# Patient Record
Sex: MALE | Race: WHITE | NOT HISPANIC OR LATINO
[De-identification: names, ages, dates, MRNs, and addresses within clinical notes are randomized per-mention and may not be internally consistent; named-entity substitution may affect disease eponyms.]

---

## 2019-04-24 PROBLEM — Z00.00 ENCOUNTER FOR PREVENTIVE HEALTH EXAMINATION: Status: ACTIVE | Noted: 2019-04-24

## 2019-04-29 ENCOUNTER — APPOINTMENT (OUTPATIENT)
Dept: CARDIOLOGY | Facility: CLINIC | Age: 69
End: 2019-04-29
Payer: MEDICARE

## 2019-04-29 VITALS
HEIGHT: 70 IN | BODY MASS INDEX: 26.2 KG/M2 | DIASTOLIC BLOOD PRESSURE: 70 MMHG | WEIGHT: 183 LBS | SYSTOLIC BLOOD PRESSURE: 102 MMHG | HEART RATE: 61 BPM

## 2019-04-29 VITALS
SYSTOLIC BLOOD PRESSURE: 102 MMHG | HEART RATE: 81 BPM | WEIGHT: 183 LBS | DIASTOLIC BLOOD PRESSURE: 70 MMHG | BODY MASS INDEX: 26.2 KG/M2 | HEIGHT: 70 IN

## 2019-04-29 DIAGNOSIS — Z86.59 PERSONAL HISTORY OF OTHER MENTAL AND BEHAVIORAL DISORDERS: ICD-10-CM

## 2019-04-29 DIAGNOSIS — Z78.9 OTHER SPECIFIED HEALTH STATUS: ICD-10-CM

## 2019-04-29 DIAGNOSIS — R53.83 OTHER FATIGUE: ICD-10-CM

## 2019-04-29 PROCEDURE — 93015 CV STRESS TEST SUPVJ I&R: CPT

## 2019-04-29 PROCEDURE — 99202 OFFICE O/P NEW SF 15 MIN: CPT | Mod: 25

## 2019-04-29 RX ORDER — ESCITALOPRAM OXALATE 20 MG/1
20 TABLET, FILM COATED ORAL DAILY
Refills: 0 | Status: ACTIVE | COMMUNITY

## 2019-04-29 RX ORDER — BUPROPION HYDROCHLORIDE 300 MG/1
300 TABLET, EXTENDED RELEASE ORAL DAILY
Refills: 0 | Status: ACTIVE | COMMUNITY

## 2019-04-29 NOTE — PHYSICAL EXAM

## 2019-04-29 NOTE — DISCUSSION/SUMMARY
[FreeTextEntry1] : The patient's cardiac examination was normal. As part of today's evaluation I performed a stress test which revealed no evidence of exercise-induced myocardial ischemia or arrhythmias. The exercise capacity is somewhat limited which was expected since he has not been active over the past several months. I see no cardiac cause of the patient's fatigue. I urged him to get back into a program of daily aerobic exercise and build up from there. 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 I did not feel that any further cardiac testing was necessary and I did not prescribe any medications at today's visit.

## 2019-04-29 NOTE — HISTORY OF PRESENT ILLNESS
[FreeTextEntry1] : The patient has enjoyed excellent health throughout his life. His only hospitalization was for an appendectomy. There is no history of hypertension diabetes or hyperlipidemia. He is a never smoker. Alcohol use is very infrequent.

## 2023-06-20 ENCOUNTER — APPOINTMENT (OUTPATIENT)
Dept: PULMONOLOGY | Facility: CLINIC | Age: 73
End: 2023-06-20
Payer: MEDICARE

## 2023-06-20 VITALS
BODY MASS INDEX: 27.92 KG/M2 | HEIGHT: 70 IN | WEIGHT: 195 LBS | DIASTOLIC BLOOD PRESSURE: 60 MMHG | OXYGEN SATURATION: 96 % | TEMPERATURE: 95.7 F | HEART RATE: 65 BPM | SYSTOLIC BLOOD PRESSURE: 112 MMHG

## 2023-06-20 PROCEDURE — 99204 OFFICE O/P NEW MOD 45 MIN: CPT

## 2023-06-20 NOTE — HISTORY OF PRESENT ILLNESS
[TextBox_4] : 73 year old male who was recently diagnosed with BRIAN came in for initial evaluation. Has depression\par 2 weeks ago he started to c/o dyspnea on exertion. he describes dyspnea as not getting enough air in the lungs and this lasted for almost 2 hrs.\par Dyspnea started after he was diagnosed with BRIAN.\par he sleeps well at night. Does not know if he snores.\par His only complaint was fatigue for years.\par BT 2 am\par SLT 30 min\par Has mild nocturia\par WT 11 am\par takes 30 min naps\par Feels tired in the daytime.\par Denies wheezing, chest tightness. \par never diagnosed with lung disease.\par never smoker\par FHX: denies\par \par cardiac stress test was negative.\par

## 2023-06-20 NOTE — PHYSICAL EXAM
[No Acute Distress] : no acute distress [II] : Mallampati Class: II [Normal Appearance] : normal appearance [Normal Rate/Rhythm] : normal rate/rhythm [Normal S1, S2] : normal s1, s2 [No Resp Distress] : no resp distress [Clear to Auscultation Bilaterally] : clear to auscultation bilaterally [No Abnormalities] : no abnormalities [Normal Gait] : normal gait [No Clubbing] : no clubbing [No Cyanosis] : no cyanosis [No Focal Deficits] : no focal deficits [Oriented x3] : oriented x3 [Normal Affect] : normal affect

## 2023-06-27 ENCOUNTER — RESULT REVIEW (OUTPATIENT)
Age: 73
End: 2023-06-27

## 2023-08-11 ENCOUNTER — APPOINTMENT (OUTPATIENT)
Dept: PULMONOLOGY | Facility: CLINIC | Age: 73
End: 2023-08-11
Payer: MEDICARE

## 2023-08-11 VITALS
HEART RATE: 85 BPM | DIASTOLIC BLOOD PRESSURE: 70 MMHG | HEIGHT: 70 IN | OXYGEN SATURATION: 98 % | SYSTOLIC BLOOD PRESSURE: 122 MMHG | TEMPERATURE: 95 F | WEIGHT: 195 LBS | BODY MASS INDEX: 27.92 KG/M2

## 2023-08-11 DIAGNOSIS — R06.09 OTHER FORMS OF DYSPNEA: ICD-10-CM

## 2023-08-11 PROCEDURE — 99214 OFFICE O/P EST MOD 30 MIN: CPT

## 2023-08-11 NOTE — PHYSICAL EXAM
[No Acute Distress] : no acute distress [Normal S1, S2] : normal s1, s2 [No Resp Distress] : no resp distress [Clear to Auscultation Bilaterally] : clear to auscultation bilaterally [No Abnormalities] : no abnormalities [Normal Gait] : normal gait [No Focal Deficits] : no focal deficits [Oriented x3] : oriented x3 [Normal Affect] : normal affect

## 2023-08-11 NOTE — HISTORY OF PRESENT ILLNESS
[TextBox_4] : 73 year old male with BRIAN and dyspnea on exertion came for f/u. He does not have dyspnea anymore since he started breathing exercises. Denies cough, hemoptysis Sleep schedule the same. Did not receive PAP yet. [facility] does not have the initial note before the sleep study from his PMD CXR seen, my read: no infiltrates PFT's with mild decrease DLCO 6 MWT no desaturations

## 2023-11-20 ENCOUNTER — APPOINTMENT (OUTPATIENT)
Dept: PULMONOLOGY | Facility: CLINIC | Age: 73
End: 2023-11-20
Payer: MEDICARE

## 2023-11-20 VITALS
DIASTOLIC BLOOD PRESSURE: 70 MMHG | HEIGHT: 70 IN | SYSTOLIC BLOOD PRESSURE: 103 MMHG | BODY MASS INDEX: 27.92 KG/M2 | TEMPERATURE: 96.8 F | WEIGHT: 195 LBS | HEART RATE: 63 BPM | OXYGEN SATURATION: 98 %

## 2023-11-20 PROCEDURE — 99213 OFFICE O/P EST LOW 20 MIN: CPT

## 2024-05-20 ENCOUNTER — APPOINTMENT (OUTPATIENT)
Dept: PULMONOLOGY | Facility: CLINIC | Age: 74
End: 2024-05-20
Payer: MEDICARE

## 2024-05-20 VITALS
WEIGHT: 195 LBS | SYSTOLIC BLOOD PRESSURE: 109 MMHG | HEIGHT: 70 IN | HEART RATE: 68 BPM | DIASTOLIC BLOOD PRESSURE: 64 MMHG | TEMPERATURE: 97 F | BODY MASS INDEX: 27.92 KG/M2 | OXYGEN SATURATION: 97 %

## 2024-05-20 DIAGNOSIS — G47.33 OBSTRUCTIVE SLEEP APNEA (ADULT) (PEDIATRIC): ICD-10-CM

## 2024-05-20 PROCEDURE — 99213 OFFICE O/P EST LOW 20 MIN: CPT

## 2024-05-20 NOTE — HISTORY OF PRESENT ILLNESS
[FreeTextEntry1] : 74 year old male with moderate BRIAN came for f/u Compliance report reviewed: Usage 47% Average use 2 hrs 10 min P95 7>4 Leak 7 AHI 2.8 He did not use PAP last month due to a severe leg pain that now has resolved and he started to use PAP again He sleeps well with PAP and has a good mask fit Went over compliance report with him

## 2024-06-07 ENCOUNTER — TRANSCRIPTION ENCOUNTER (OUTPATIENT)
Age: 74
End: 2024-06-07

## 2025-07-18 ENCOUNTER — APPOINTMENT (OUTPATIENT)
Dept: GASTROENTEROLOGY | Facility: CLINIC | Age: 75
End: 2025-07-18
Payer: MEDICARE

## 2025-07-18 VITALS
HEART RATE: 56 BPM | OXYGEN SATURATION: 95 % | SYSTOLIC BLOOD PRESSURE: 120 MMHG | BODY MASS INDEX: 28.63 KG/M2 | DIASTOLIC BLOOD PRESSURE: 70 MMHG | WEIGHT: 200 LBS | HEIGHT: 70 IN

## 2025-07-18 PROBLEM — Z12.11 ENCOUNTER FOR SCREENING COLONOSCOPY: Status: ACTIVE | Noted: 2025-07-18 | Resolved: 2025-08-01

## 2025-07-18 PROBLEM — Z86.0100 HISTORY OF COLON POLYPS: Status: ACTIVE | Noted: 2025-07-18

## 2025-07-18 PROCEDURE — 99204 OFFICE O/P NEW MOD 45 MIN: CPT

## 2025-07-18 PROCEDURE — G2211 COMPLEX E/M VISIT ADD ON: CPT

## 2025-07-18 RX ORDER — MAGNESIUM OXIDE 420 MG/1
TABLET ORAL
Refills: 0 | Status: ACTIVE | COMMUNITY

## 2025-07-18 RX ORDER — AZELASTINE HYDROCHLORIDE AND FLUTICASONE PROPIONATE 137; 50 UG/1; UG/1
137-50 SPRAY, METERED NASAL
Refills: 0 | Status: ACTIVE | COMMUNITY

## 2025-07-18 RX ORDER — ATORVASTATIN CALCIUM 10 MG/1
10 TABLET, FILM COATED ORAL
Refills: 0 | Status: ACTIVE | COMMUNITY

## 2025-07-26 ENCOUNTER — NON-APPOINTMENT (OUTPATIENT)
Age: 75
End: 2025-07-26